# Patient Record
Sex: MALE | URBAN - METROPOLITAN AREA
[De-identification: names, ages, dates, MRNs, and addresses within clinical notes are randomized per-mention and may not be internally consistent; named-entity substitution may affect disease eponyms.]

---

## 2022-09-26 ENCOUNTER — NURSE TRIAGE (OUTPATIENT)
Dept: ADMINISTRATIVE | Facility: CLINIC | Age: 67
End: 2022-09-26

## 2022-09-27 NOTE — TELEPHONE ENCOUNTER
"Patient's daughter states patient c/o nausea, vomiting x 2 episodes, temperature of 101 and tremors x 20 minutes. Daughter states patient was nauseated and manually induced vomiting. Patient denies pain and history of diabetes.     Care Advice given to See PCP within 4 Hours or Visit an Urgent Care Center/ED within 4 hours. Patient also advised to follow-up with PCP in AM.     Reason for Disposition   MILD or MODERATE vomiting (e.g., 1 - 5 times / day)   Nursing judgment or information in reference    Additional Information   Negative: Shock suspected (e.g., cold/pale/clammy skin, too weak to stand, low BP, rapid pulse)   Negative: Difficult to awaken or acting confused (e.g., disoriented, slurred speech)   Negative: Sounds like a life-threatening emergency to the triager   Negative: Vomiting occurs only while coughing   Negative: [1] Pregnant < 20 Weeks AND [2] nausea/vomiting began in early pregnancy (i.e., 4-8 weeks pregnant)   Negative: Chest pain   Negative: Headache is main symptom   Negative: Vomiting (or Nausea) in a cancer patient who is currently (or recently) receiving chemotherapy or radiation therapy, or cancer patient who has metastatic or end-stage cancer and is receiving palliative care   Negative: [1] Vomiting AND [2] contains red blood or black ("coffee ground") material  (Exception: few red streaks in vomit that only happened once)   Negative: Severe pain in one eye   Negative: Recent head injury (within last 3 days)   Negative: Recent abdominal injury (within last 3 days)   Negative: [1] Insulin-dependent diabetes (Type I) AND [2] glucose > 400 mg/dl (22 mmol/l)   Negative: [1] Vomiting AND [2] hernia is more painful or swollen than usual   Negative: [1] SEVERE vomiting (e.g., 6 or more times/day) AND [2] present > 8 hours (Exception: patient sounds well, is drinking liquids, does not sound dehydrated, and vomiting has lasted less than 24 hours)   Negative: [1] MODERATE vomiting (e.g., 3 - 5 " times/day) AND [2] age > 60 years   Negative: Severe headache (e.g., excruciating) (Exception: similar to previous migraines)   Negative: High-risk adult (e.g., diabetes mellitus, brain tumor, V-P shunt, hernia)   Negative: [1] Drinking very little AND [2] dehydration suspected (e.g., no urine > 12 hours, very dry mouth, very lightheaded)   Negative: Patient sounds very sick or weak to the triager   Negative: [1] Vomiting AND [2] abdomen looks much more swollen than usual   Negative: [1] Vomiting AND [2] contains bile (green color)   Negative: [1] Constant abdominal pain AND [2] present > 2 hours   Negative: [1] Fever > 103 F (39.4 C) AND [2] not able to get the fever down using Fever Care Advice   Negative: [1] Fever > 101 F (38.3 C) AND [2] age > 60 years   Negative: [1] Fever > 100.0 F (37.8 C) AND [2] bedridden (e.g., nursing home patient, CVA, chronic illness, recovering from surgery)   Negative: [1] Fever > 100.0 F (37.8 C) AND [2] weak immune system (e.g., HIV positive, cancer chemo, splenectomy, organ transplant, chronic steroids)   Negative: Taking any of the following medications: digoxin (Lanoxin), lithium, theophylline, phenytoin (Dilantin)   Negative: [1] MILD or MODERATE vomiting AND [2] present > 48 hours (2 days) (Exception: mild vomiting with associated diarrhea)   Negative: Fever present > 3 days (72 hours)   Negative: Vomiting a prescription medication   Negative: [1] MILD vomiting with diarrhea AND [2] present > 5 days   Negative: Substance use (drug use) or unhealthy alcohol use, known or suspected   Negative: Vomiting is a chronic symptom (recurrent or ongoing AND present > 4 weeks)   Negative: [1] SEVERE vomiting (e.g., 6 or more times/day, vomits everything) BUT [2] hydrated   Negative: Nursing judgment, per information in Reference   Negative: Information only call about a Well Adult (no illness or injury)   Negative: Caller can't be reached by phone   Negative: Nursing judgment or  information in reference   Negative: Nursing judgment or information in reference   Negative: Nursing judgment or information in reference    Protocols used: Vomiting-A-AH, No Guideline [id number]